# Patient Record
Sex: MALE | Race: WHITE | NOT HISPANIC OR LATINO | URBAN - METROPOLITAN AREA
[De-identification: names, ages, dates, MRNs, and addresses within clinical notes are randomized per-mention and may not be internally consistent; named-entity substitution may affect disease eponyms.]

---

## 2017-07-14 ENCOUNTER — IMPORTED ENCOUNTER (OUTPATIENT)
Dept: URBAN - METROPOLITAN AREA CLINIC 50 | Facility: CLINIC | Age: 62
End: 2017-07-14

## 2017-07-28 ENCOUNTER — IMPORTED ENCOUNTER (OUTPATIENT)
Dept: URBAN - METROPOLITAN AREA CLINIC 50 | Facility: CLINIC | Age: 62
End: 2017-07-28

## 2018-11-09 ENCOUNTER — IMPORTED ENCOUNTER (OUTPATIENT)
Dept: URBAN - METROPOLITAN AREA CLINIC 50 | Facility: CLINIC | Age: 63
End: 2018-11-09

## 2019-11-01 ENCOUNTER — IMPORTED ENCOUNTER (OUTPATIENT)
Dept: URBAN - METROPOLITAN AREA CLINIC 50 | Facility: CLINIC | Age: 64
End: 2019-11-01

## 2020-11-05 ENCOUNTER — IMPORTED ENCOUNTER (OUTPATIENT)
Dept: URBAN - METROPOLITAN AREA CLINIC 50 | Facility: CLINIC | Age: 65
End: 2020-11-05

## 2020-11-05 NOTE — PATIENT DISCUSSION
"""Dry eyes OU seen on todays exam."" ""Increase Artificial tears both eyes two - three times a day . """

## 2020-11-05 NOTE — PATIENT DISCUSSION
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 Recommend ptosis visual field and photos at patient's convenience for possible blepharoplasty. Patient will be contacted with results.  """

## 2020-11-19 ENCOUNTER — IMPORTED ENCOUNTER (OUTPATIENT)
Dept: URBAN - METROPOLITAN AREA CLINIC 50 | Facility: CLINIC | Age: 65
End: 2020-11-19

## 2021-02-11 ENCOUNTER — IMPORTED ENCOUNTER (OUTPATIENT)
Dept: URBAN - METROPOLITAN AREA CLINIC 50 | Facility: CLINIC | Age: 66
End: 2021-02-11

## 2021-02-11 NOTE — PATIENT DISCUSSION
"""D/w patient we will excise lesion during bleph surgery with pathology. Patient agrees.  No need ""

## 2021-04-01 ENCOUNTER — IMPORTED ENCOUNTER (OUTPATIENT)
Dept: URBAN - METROPOLITAN AREA CLINIC 50 | Facility: CLINIC | Age: 66
End: 2021-04-01

## 2021-04-01 NOTE — PATIENT DISCUSSION
"""Recommend excision of 2 mm lesion left upper lid with pathology at time of bilateral upper lid ""

## 2021-04-01 NOTE — PATIENT DISCUSSION
"""Recommend Bilateral Upper Lid Blepharoplasty with excision of Lesion(s) left upper lid 04/13/2021 at [facility].  Discussed with patient [id number]

## 2021-04-16 ASSESSMENT — VISUAL ACUITY
OS_CC: J1+
OS_BAT: 20/20
OD_OTHER: 20/40. 20/100.
OD_SC: 20/25
OS_SC: 20/25+2
OD_SC: 20/20
OD_BAT: 20/30
OD_SC: 20/30+2
OD_OTHER: 20/30. 20/50.
OS_OTHER: 20/20. 20/20.
OD_SC: 20/25
OD_CC: J1+
OD_PH: @ 19 IN
OS_SC: 20/25
OS_SC: 20/25
OS_OTHER: 20/30. 20/50.
OD_BAT: 20/40
OD_OTHER: 20/20. 20/20.
OD_SC: 20/25-
OS_BAT: 20/30
OS_BAT: 20/30
OS_SC: 20/25-1
OD_SC: 20/30-
OS_SC: 20/25-
OD_PH: 20/25
OS_CC: J3
OS_SC: 20/25
OD_CC: J3
OS_PH: @ 19 IN
OS_OTHER: 20/30. 20/40.
OD_BAT: 20/20

## 2021-04-16 ASSESSMENT — TONOMETRY
OD_IOP_MMHG: 15
OS_IOP_MMHG: 15
OS_IOP_MMHG: 12
OD_IOP_MMHG: 15
OS_IOP_MMHG: 16
OS_IOP_MMHG: 12
OS_IOP_MMHG: 16
OD_IOP_MMHG: 11
OD_IOP_MMHG: 12
OD_IOP_MMHG: 16

## 2021-04-22 ENCOUNTER — 1 WEEK POST-OP (OUTPATIENT)
Dept: URBAN - METROPOLITAN AREA CLINIC 53 | Facility: CLINIC | Age: 66
End: 2021-04-22

## 2021-04-22 DIAGNOSIS — Z98.890: ICD-10-CM

## 2021-04-22 PROCEDURE — 99024 POSTOP FOLLOW-UP VISIT: CPT

## 2021-04-22 ASSESSMENT — VISUAL ACUITY
OD_SC: 20/25-
OS_SC: 20/25

## 2021-04-22 NOTE — PATIENT DISCUSSION
1 Week PO: Healing well. Sutures removed in-office today. Instructed patient to continue using Erythromycin ointment for 3 more nights, then discontinue.

## 2021-05-13 ENCOUNTER — 4 WEEK POST-OP (OUTPATIENT)
Dept: URBAN - METROPOLITAN AREA CLINIC 53 | Facility: CLINIC | Age: 66
End: 2021-05-13

## 2021-05-13 DIAGNOSIS — Z98.890: ICD-10-CM

## 2021-05-13 PROCEDURE — 92285 EXTERNAL OCULAR PHOTOGRAPHY: CPT

## 2021-05-13 PROCEDURE — 99024 POSTOP FOLLOW-UP VISIT: CPT

## 2021-05-13 ASSESSMENT — VISUAL ACUITY
OS_SC: 20/20
OD_SC: 20/25

## 2021-05-13 ASSESSMENT — TONOMETRY
OS_IOP_MMHG: 14
OD_IOP_MMHG: 14

## 2021-11-04 ENCOUNTER — ANNUAL COMPREHENSIVE EXAM (OUTPATIENT)
Dept: URBAN - METROPOLITAN AREA CLINIC 53 | Facility: CLINIC | Age: 66
End: 2021-11-04

## 2021-11-04 DIAGNOSIS — H35.373: ICD-10-CM

## 2021-11-04 DIAGNOSIS — H43.811: ICD-10-CM

## 2021-11-04 DIAGNOSIS — Z98.890: ICD-10-CM

## 2021-11-04 DIAGNOSIS — H04.123: ICD-10-CM

## 2021-11-04 PROCEDURE — 92134 CPTRZ OPH DX IMG PST SGM RTA: CPT

## 2021-11-04 PROCEDURE — 92014 COMPRE OPH EXAM EST PT 1/>: CPT

## 2021-11-04 ASSESSMENT — VISUAL ACUITY
OS_GLARE: 20/20
OD_GLARE: 20/20
OD_GLARE: 20/20
OD_CC: J1+
OS_GLARE: 20/20
OS_SC: 20/20
OD_SC: 20/20
OS_CC: J1+

## 2021-11-04 ASSESSMENT — TONOMETRY
OD_IOP_MMHG: 14
OS_IOP_MMHG: 14

## 2022-11-04 ENCOUNTER — COMPREHENSIVE EXAM (OUTPATIENT)
Dept: URBAN - METROPOLITAN AREA CLINIC 53 | Facility: CLINIC | Age: 67
End: 2022-11-04

## 2022-11-04 DIAGNOSIS — H35.373: ICD-10-CM

## 2022-11-04 DIAGNOSIS — H16.223: ICD-10-CM

## 2022-11-04 PROCEDURE — 92014 COMPRE OPH EXAM EST PT 1/>: CPT

## 2022-11-04 PROCEDURE — 92134 CPTRZ OPH DX IMG PST SGM RTA: CPT

## 2022-11-04 ASSESSMENT — TONOMETRY
OS_IOP_MMHG: 12
OD_IOP_MMHG: 12

## 2022-11-04 ASSESSMENT — VISUAL ACUITY
OS_GLARE: 20/25
OU_SC: 20/25-1
OS_SC: 20/25-2
OS_GLARE: 20/30
OD_GLARE: 20/30
OU_SC: J2
OD_GLARE: 20/25
OD_SC: 20/25-1

## 2023-12-06 ENCOUNTER — COMPREHENSIVE EXAM (OUTPATIENT)
Dept: URBAN - METROPOLITAN AREA CLINIC 52 | Facility: CLINIC | Age: 68
End: 2023-12-06

## 2023-12-06 DIAGNOSIS — H16.223: ICD-10-CM

## 2023-12-06 DIAGNOSIS — H35.373: ICD-10-CM

## 2023-12-06 DIAGNOSIS — H43.811: ICD-10-CM

## 2023-12-06 PROCEDURE — 92015 DETERMINE REFRACTIVE STATE: CPT

## 2023-12-06 PROCEDURE — 92134 CPTRZ OPH DX IMG PST SGM RTA: CPT

## 2023-12-06 PROCEDURE — 99214 OFFICE O/P EST MOD 30 MIN: CPT

## 2023-12-06 ASSESSMENT — VISUAL ACUITY
OD_SC: 20/40
OS_GLARE: 20/25
OS_PH: 20/20-2
OD_GLARE: 20/25
OS_GLARE: 20/50
OD_PH: 20/25
OU_SC: J3
OS_SC: 20/30
OD_GLARE: 20/40

## 2023-12-06 ASSESSMENT — TONOMETRY
OD_IOP_MMHG: 14
OS_IOP_MMHG: 14

## 2024-12-05 ENCOUNTER — COMPREHENSIVE EXAM (OUTPATIENT)
Age: 69
End: 2024-12-05

## 2024-12-05 DIAGNOSIS — H35.373: ICD-10-CM

## 2024-12-05 DIAGNOSIS — H43.811: ICD-10-CM

## 2024-12-05 DIAGNOSIS — H16.223: ICD-10-CM

## 2024-12-05 PROCEDURE — 99214 OFFICE O/P EST MOD 30 MIN: CPT

## 2024-12-05 PROCEDURE — 92134 CPTRZ OPH DX IMG PST SGM RTA: CPT
